# Patient Record
Sex: FEMALE | Race: WHITE | Employment: FULL TIME | URBAN - METROPOLITAN AREA
[De-identification: names, ages, dates, MRNs, and addresses within clinical notes are randomized per-mention and may not be internally consistent; named-entity substitution may affect disease eponyms.]

---

## 2019-03-06 ENCOUNTER — APPOINTMENT (OUTPATIENT)
Dept: NUCLEAR MEDICINE | Facility: CLINIC | Age: 64
End: 2019-03-06
Attending: EMERGENCY MEDICINE
Payer: COMMERCIAL

## 2019-03-06 ENCOUNTER — APPOINTMENT (OUTPATIENT)
Dept: GENERAL RADIOLOGY | Facility: CLINIC | Age: 64
End: 2019-03-06
Attending: EMERGENCY MEDICINE
Payer: COMMERCIAL

## 2019-03-06 ENCOUNTER — HOSPITAL ENCOUNTER (EMERGENCY)
Facility: CLINIC | Age: 64
Discharge: HOME | End: 2019-03-06
Attending: EMERGENCY MEDICINE | Admitting: EMERGENCY MEDICINE
Payer: COMMERCIAL

## 2019-03-06 VITALS
TEMPERATURE: 98.8 F | HEIGHT: 62 IN | SYSTOLIC BLOOD PRESSURE: 144 MMHG | DIASTOLIC BLOOD PRESSURE: 77 MMHG | HEART RATE: 71 BPM | RESPIRATION RATE: 16 BRPM | OXYGEN SATURATION: 98 % | BODY MASS INDEX: 32.62 KG/M2 | WEIGHT: 177.25 LBS

## 2019-03-06 DIAGNOSIS — R07.89 CHEST HEAVINESS: ICD-10-CM

## 2019-03-06 LAB
ANION GAP SERPL CALCULATED.3IONS-SCNC: 7 MMOL/L (ref 3–14)
BASOPHILS # BLD AUTO: 0.1 10E9/L (ref 0–0.2)
BASOPHILS NFR BLD AUTO: 1.3 %
BUN SERPL-MCNC: 14 MG/DL (ref 7–30)
CALCIUM SERPL-MCNC: 8.7 MG/DL (ref 8.5–10.1)
CHLORIDE SERPL-SCNC: 109 MMOL/L (ref 94–109)
CO2 SERPL-SCNC: 25 MMOL/L (ref 20–32)
CREAT SERPL-MCNC: 0.57 MG/DL (ref 0.52–1.04)
D DIMER PPP FEU-MCNC: 3.4 UG/ML FEU (ref 0–0.5)
DIFFERENTIAL METHOD BLD: ABNORMAL
EOSINOPHIL # BLD AUTO: 0.6 10E9/L (ref 0–0.7)
EOSINOPHIL NFR BLD AUTO: 11.1 %
ERYTHROCYTE [DISTWIDTH] IN BLOOD BY AUTOMATED COUNT: 13.7 % (ref 10–15)
GFR SERPL CREATININE-BSD FRML MDRD: >90 ML/MIN/{1.73_M2}
GLUCOSE SERPL-MCNC: 95 MG/DL (ref 70–99)
HCT VFR BLD AUTO: 36.3 % (ref 35–47)
HGB BLD-MCNC: 11.3 G/DL (ref 11.7–15.7)
IMM GRANULOCYTES # BLD: 0 10E9/L (ref 0–0.4)
IMM GRANULOCYTES NFR BLD: 0.4 %
INTERPRETATION ECG - MUSE: NORMAL
LYMPHOCYTES # BLD AUTO: 1.1 10E9/L (ref 0.8–5.3)
LYMPHOCYTES NFR BLD AUTO: 20.5 %
MCH RBC QN AUTO: 28.7 PG (ref 26.5–33)
MCHC RBC AUTO-ENTMCNC: 31.1 G/DL (ref 31.5–36.5)
MCV RBC AUTO: 92 FL (ref 78–100)
MONOCYTES # BLD AUTO: 0.4 10E9/L (ref 0–1.3)
MONOCYTES NFR BLD AUTO: 7.5 %
NEUTROPHILS # BLD AUTO: 3.2 10E9/L (ref 1.6–8.3)
NEUTROPHILS NFR BLD AUTO: 59.2 %
NRBC # BLD AUTO: 0 10*3/UL
NRBC BLD AUTO-RTO: 0 /100
PLATELET # BLD AUTO: 374 10E9/L (ref 150–450)
POTASSIUM SERPL-SCNC: 4 MMOL/L (ref 3.4–5.3)
RBC # BLD AUTO: 3.94 10E12/L (ref 3.8–5.2)
SODIUM SERPL-SCNC: 141 MMOL/L (ref 133–144)
TROPONIN I SERPL-MCNC: <0.015 UG/L (ref 0–0.04)
TROPONIN I SERPL-MCNC: <0.015 UG/L (ref 0–0.04)
TSH SERPL DL<=0.005 MIU/L-ACNC: 0.66 MU/L (ref 0.4–4)
WBC # BLD AUTO: 5.3 10E9/L (ref 4–11)

## 2019-03-06 PROCEDURE — 34300033 ZZH RX 343: Performed by: EMERGENCY MEDICINE

## 2019-03-06 PROCEDURE — 99285 EMERGENCY DEPT VISIT HI MDM: CPT | Mod: 25

## 2019-03-06 PROCEDURE — 78580 LUNG PERFUSION IMAGING: CPT

## 2019-03-06 PROCEDURE — 93005 ELECTROCARDIOGRAM TRACING: CPT

## 2019-03-06 PROCEDURE — 85379 FIBRIN DEGRADATION QUANT: CPT | Performed by: EMERGENCY MEDICINE

## 2019-03-06 PROCEDURE — 25000132 ZZH RX MED GY IP 250 OP 250 PS 637: Performed by: EMERGENCY MEDICINE

## 2019-03-06 PROCEDURE — 85025 COMPLETE CBC W/AUTO DIFF WBC: CPT | Performed by: EMERGENCY MEDICINE

## 2019-03-06 PROCEDURE — 84484 ASSAY OF TROPONIN QUANT: CPT | Performed by: EMERGENCY MEDICINE

## 2019-03-06 PROCEDURE — 71046 X-RAY EXAM CHEST 2 VIEWS: CPT

## 2019-03-06 PROCEDURE — 84443 ASSAY THYROID STIM HORMONE: CPT | Performed by: EMERGENCY MEDICINE

## 2019-03-06 PROCEDURE — A9540 TC99M MAA: HCPCS | Performed by: EMERGENCY MEDICINE

## 2019-03-06 PROCEDURE — 80048 BASIC METABOLIC PNL TOTAL CA: CPT | Performed by: EMERGENCY MEDICINE

## 2019-03-06 RX ORDER — OXYCODONE HYDROCHLORIDE 5 MG/1
5-10 TABLET ORAL
COMMUNITY
Start: 2019-03-04

## 2019-03-06 RX ORDER — ALBUTEROL SULFATE 90 UG/1
2 AEROSOL, METERED RESPIRATORY (INHALATION)
COMMUNITY
Start: 2016-02-17

## 2019-03-06 RX ORDER — VALACYCLOVIR HYDROCHLORIDE 1 G/1
1 TABLET, FILM COATED ORAL
COMMUNITY
Start: 2016-02-16

## 2019-03-06 RX ORDER — MONTELUKAST SODIUM 10 MG/1
10 TABLET ORAL
COMMUNITY
Start: 2016-08-01

## 2019-03-06 RX ORDER — ESCITALOPRAM OXALATE 20 MG/1
20 TABLET ORAL
COMMUNITY
Start: 2016-08-08

## 2019-03-06 RX ORDER — MULTIVIT-MIN/IRON/FOLIC ACID/K 18-600-40
1 CAPSULE ORAL
COMMUNITY
Start: 2009-12-01

## 2019-03-06 RX ORDER — ALBUTEROL SULFATE 0.83 MG/ML
2.5 SOLUTION RESPIRATORY (INHALATION)
COMMUNITY
Start: 2009-12-09

## 2019-03-06 RX ORDER — ASPIRIN 81 MG/1
162 TABLET ORAL
COMMUNITY
Start: 2019-02-06 | End: 2019-03-20

## 2019-03-06 RX ORDER — IBUPROFEN 200 MG
400 TABLET ORAL
COMMUNITY

## 2019-03-06 RX ORDER — GABAPENTIN 300 MG/1
300 CAPSULE ORAL
COMMUNITY
Start: 2019-02-06 | End: 2019-03-08

## 2019-03-06 RX ORDER — FLUTICASONE PROPIONATE 50 MCG
2 SPRAY, SUSPENSION (ML) NASAL
COMMUNITY
Start: 2016-08-01

## 2019-03-06 RX ORDER — ACETAMINOPHEN 325 MG/1
325-650 TABLET ORAL
COMMUNITY
Start: 2014-07-01

## 2019-03-06 RX ORDER — PRAMIPEXOLE DIHYDROCHLORIDE 0.12 MG/1
TABLET ORAL
COMMUNITY
Start: 2016-08-05

## 2019-03-06 RX ORDER — LISINOPRIL 5 MG/1
5 TABLET ORAL
COMMUNITY
Start: 2016-08-01

## 2019-03-06 RX ORDER — ASPIRIN 325 MG
325 TABLET ORAL ONCE
Status: COMPLETED | OUTPATIENT
Start: 2019-03-06 | End: 2019-03-06

## 2019-03-06 RX ORDER — BACLOFEN 10 MG/1
5 TABLET ORAL
COMMUNITY
Start: 2016-08-05

## 2019-03-06 RX ADMIN — ASPIRIN 325 MG ORAL TABLET 325 MG: 325 PILL ORAL at 08:39

## 2019-03-06 RX ADMIN — Medication 3.3 MCI.: at 14:01

## 2019-03-06 ASSESSMENT — ENCOUNTER SYMPTOMS
FEVER: 0
SHORTNESS OF BREATH: 0
ABDOMINAL PAIN: 0
NAUSEA: 0
DIARRHEA: 0
VOMITING: 0
BACK PAIN: 0

## 2019-03-06 ASSESSMENT — MIFFLIN-ST. JEOR: SCORE: 1317.25

## 2019-03-06 NOTE — ED AVS SNAPSHOT
[facility] Emergency Department  [address]  [facility] [address]  Phone:  [phone]  Fax:  [phone]                                    Brandy Garcia   MRN: [id number]    Department:  [facility] Emergency Department   Date of Visit:  3/6/2019           After Visit Summary Signature Page    I have received my discharge instructions, and my questions have been answered. I have discussed any challenges I see with this plan with the nurse or doctor.    ..........................................................................................................................................  Patient/Patient Representative Signature      ..........................................................................................................................................  Patient Representative Print Name and Relationship to Patient    ..................................................               ................................................  Date                                   Time    ..........................................................................................................................................  Reviewed by Signature/Title    ...................................................              ..............................................  Date                                               Time          22EPIC Rev 08/18

## 2019-03-06 NOTE — ED PROVIDER NOTES
"  History     Chief Complaint:    Chest Pressure    HPI   Brandy Garcia is a 63 year old female with a history of hypertension who presents with chest pressure. The patient reports that for the past 2 days she has been experiencing chest pressure that she notes feels like there is a \"lump\" in her chest. She recalls that she was initially suspicious for indigestion or a blockage as she has a history of gastric bypass 10 years ago. The patient states that the sensation is not subsiding and it worsens when stands up. She denies shortness of breath, fever, nausea, vomiting, diarrhea, abdominal pain, chest pain, rash, back pain, or pain in her extremities. The patient has not taken anything for her discomfort. She also has not had any recent illness or a history of stress test. Lastly, the patient reports that she had a left knee replacement 4 weeks ago that is recovering as planned and she attended physical therapy yesterday without difficulty.    Cardiac/PE/DVT Risk Factors:  History of hypertension - Positive  History of hyperlipidemia - Negative  History of diabetes - Negative  History of smoking - Negative  Personal history of PE/DVT - Negative  Family history of heart complications - Family history unknown.   Recent travel - Negative  Recent surgery - Positive    Allergies:  Contrast day; anaphylaxis     Medications:    Albuterol  Aspirin  Lioresal  Lexapro  Flonase  Advair Diskus  Gabapentin  Lisinopril  Singulair  Mirapex  Valtrex    Past Medical History:    Hypertension     Past Surgical History:    Left knee replacement     Family History:    Family history reviewed. No pertinent family history.     Social History:  The patient was accompanied to the ED by .  Smoking Status: Never Smoker  Smokeless Tobacco: Never Used  Alcohol Use: Positive  Drug Use: Negative  PCP: Shanta Garnica   Marital Status:        Review of Systems   Constitutional: Negative for fever.   Respiratory: Negative for " "shortness of breath.    Cardiovascular: Negative for chest pain.        Chest pressure   Gastrointestinal: Negative for abdominal pain, diarrhea, nausea and vomiting.   Musculoskeletal: Negative for back pain.        No extremity pain   Skin: Negative for rash.   All other systems reviewed and are negative.    Physical Exam     Patient Vitals for the past 24 hrs:   BP Temp Temp src Pulse Heart Rate Resp SpO2 Height Weight   03/06/19 1453 144/77 -- -- 71 -- 16 98 % -- --   03/06/19 1445 144/77 -- -- -- -- -- -- -- --   03/06/19 1315 -- -- -- -- 94 -- -- -- --   03/06/19 1300 (!) 135/91 -- -- 84 84 -- -- -- --   03/06/19 1245 125/60 -- -- 87 89 -- -- -- --   03/06/19 1230 132/80 -- -- 87 86 -- -- -- --   03/06/19 1215 121/58 -- -- 87 89 -- -- -- --   03/06/19 1200 127/62 -- -- 86 84 -- -- -- --   03/06/19 1145 128/66 -- -- 82 84 -- -- -- --   03/06/19 1130 -- -- -- -- 84 -- -- -- --   03/06/19 1115 -- -- -- -- 79 -- -- -- --   03/06/19 1100 -- -- -- -- 77 -- 96 % -- --   03/06/19 1045 (!) 141/106 -- -- 80 73 -- 97 % -- --   03/06/19 1030 (!) 142/94 -- -- 75 71 -- 96 % -- --   03/06/19 1015 (!) 146/108 -- -- 73 71 -- 95 % -- --   03/06/19 1000 (!) 130/110 -- -- 73 69 -- 98 % -- --   03/06/19 0945 129/86 -- -- 73 73 -- 95 % -- --   03/06/19 0930 138/76 -- -- 72 68 -- 98 % -- --   03/06/19 0915 131/74 -- -- 68 70 -- 95 % -- --   03/06/19 0900 142/77 -- -- 71 70 -- 95 % -- --   03/06/19 0845 (!) 126/103 -- -- 77 74 -- 98 % -- --   03/06/19 0830 135/80 -- -- 67 63 -- 98 % -- --   03/06/19 0815 132/82 -- -- 70 67 -- -- -- --   03/06/19 0800 (!) 132/109 -- -- 70 68 -- 98 % -- --   03/06/19 0717 (!) 154/91 98.8  F (37.1  C) Oral 72 72 16 97 % 1.575 m (5' 2\") 80.4 kg (177 lb 4 oz)     Physical Exam     General: Alert, appears well-developed and well-nourished. Cooperative.     In no distress  HEENT:  Head:  Atraumatic  Ears:  External ears are normal  Mouth/Throat:  Oropharynx is without erythema or exudate and mucous " membranes are moist.   Eyes:   Conjunctivae normal and EOM are normal. No scleral icterus.  CV:  Normal rate, regular rhythm, normal heart sounds and radial pulses are 2+ and symmetric.  No murmur.  Resp:  Breath sounds are clear bilaterally    Non-labored, no retractions or accessory muscle use  GI:  Abdomen is soft, no distension, no tenderness. No rebound or guarding.  No CVA tenderness bilaterally  MS:  Normal range of motion. No edema.    Normal strength in all 4 extremities.     Back atraumatic.    No midline cervical, thoracic, or lumbar tenderness  Skin:  Warm and dry.  Well healing scar to left knee.   Neuro: Alert. Normal strength.  GCS: 15  Psych:  Normal mood and affect.    Emergency Department Course     ECG:  ECG taken at 0715, ECG read at 0729  Normal sinus rhythm  Normal ECG  Rate 69 bpm. IA interval 164 ms. QRS duration 86 ms. QT/QTc 394/422 ms. P-R-T axes 71 39 37.    Imaging:  Radiology findings were communicated with the patient who voiced understanding of the findings.    NM Lung Scan Perfusion Particulate   Final Result   IMPRESSION: Unremarkable lung perfusion scan.       PEG JOSHI MD      Chest XR,  PA & LAT   Final Result   IMPRESSION: Negative chest. Lungs clear.      PEG JOSHI MD      NM Lexiscan stress test (nuc card)    (Results Pending)     Laboratory:  Laboratory findings were communicated with the patient who voiced understanding of the findings.    CBC: WBC 5.3, HGB 11.3 (L),   BMP: WNL (Creatinine 0.57)  Troponin (Collected 0815): <00.15  Troponin (Collected 1107): <0.015   TSH with free T4 Reflex: 0.66  D Dimer: 3.4 (H)    Interventions:  0839 Aspirin 325 mg PO  1401 Tc99m MAA 3.3 mCi IV    Emergency Department Course:     Nursing notes and vitals reviewed.    0715 EKG obtained as noted above.    0737 I performed an exam of the patient as documented above.     0815 IV was inserted and blood was drawn for laboratory testing, results above.     1107 Patient  rechecked and updated.      1311 The patient was sent for a Lung Vent and Perf while in the emergency department, results above.      1355 The patient was sent for a XR while in the emergency department, results above.      1432 Patient rechecked and updated.       I personally reviewed the imaging, lab, and EKG results with the patient and answered all related questions prior to discharge.    HEART Score  Background  Calculates the overall risk of adverse event in patient's presenting with chest pain.  Based on 5 criteria (each assigned 0-2 points) including suspiciousness of history, EKG, age, risk factors and troponin.    Data  63 year old female  does not have a problem list on file.   reports that  has never smoked. She does not have any smokeless tobacco history on file.  family history is not on file.  No results found for: TROPI  Criteria   0-2 points for each of 5 items (maximum of 10 points):  Score 0- History slightly suspicious for coronary syndrome  Score 0- EKG Normal  Score 1- Age 45 to 65 years old  Score 1- One to 2 risk factors for atherosclerotic disease  Score 0- Within normal limits for troponin levels  Interpretation  Risk of adverse outcome  Heart Score: 2  Total Score 0-3- Adverse Outcome Risk 2.5% - Supports early discharge with appropriate follow-up    Impression & Plan      Medical Decision Making:  Brandy Garcia is a 63 year old female with a history of hypertension who presents with chest pressure ongoing for the last 2 days.  Patient recently had a total left knee replacement within the last 2 months and ultimately is concerned for potential cardiac or blood clot pathology.  Patient has an elevated d-dimer here, but does have a significant anaphylaxis to contrast dye.  A V/Q scan was obtained showing low concern for pulmonary embolism.  Patient's initial EKG shows no concerning ischemic changes.  There is no prior EKG to compare with.  Initial troponin is undetectable, lower concern  for ACS.  A repeat 3-hour troponin was obtained and remains undetectable.  In the setting of 2-3 days of ongoing chest heaviness symptoms, I suspect an atypical etiology of the patient's chest discomfort.  I do feel the patient would benefit from a stress test and ultimately due to her inability to fully range her left lower extremity she would require a medical stress test.  A nuclear stress test was ordered for the patient although due to the V/Q scan today, will be unable to obtain this until earliest Friday.  Her heart score is 2.  Ultimately she feels comfortable with close outpatient follow-up and stress testing in the next 72 hours.  Stress testing ordered prior to discharge.  She will return to the emergency department for any worsening chest pain symptoms or any new symptom onset.  All questions answered for discharge and return precautions understood.  Discharged home.    Diagnosis:    ICD-10-CM    1. Chest heaviness R07.89 NM Lexiscan stress test (nuc card)     Disposition:   The patient is discharged to home.     Discharge Medications:    No discharge medications.     Scribe Disclosure:  I, Orla Severson, am serving as a scribe at 7:32 AM on 3/6/2019 to document services personally performed by Ravin Perez MD based on my observations and the provider's statements to me.     [facility] EMERGENCY DEPARTMENT       Ravin Perez MD  03/06/19 1144

## 2019-03-08 ENCOUNTER — HOSPITAL ENCOUNTER (OUTPATIENT)
Dept: NUCLEAR MEDICINE | Facility: CLINIC | Age: 64
Setting detail: NUCLEAR MEDICINE
Discharge: HOME | End: 2019-03-08
Attending: EMERGENCY MEDICINE | Admitting: EMERGENCY MEDICINE
Payer: COMMERCIAL

## 2019-03-08 ENCOUNTER — HOSPITAL ENCOUNTER (OUTPATIENT)
Dept: CARDIOLOGY | Facility: CLINIC | Age: 64
Discharge: HOME | End: 2019-03-08
Attending: EMERGENCY MEDICINE | Admitting: EMERGENCY MEDICINE
Payer: COMMERCIAL

## 2019-03-08 DIAGNOSIS — R07.89 CHEST HEAVINESS: ICD-10-CM

## 2019-03-08 PROCEDURE — 93016 CV STRESS TEST SUPVJ ONLY: CPT | Performed by: INTERNAL MEDICINE

## 2019-03-08 PROCEDURE — 34300033 ZZH RX 343: Performed by: EMERGENCY MEDICINE

## 2019-03-08 PROCEDURE — 78451 HT MUSCLE IMAGE SPECT SING: CPT

## 2019-03-08 PROCEDURE — 93018 CV STRESS TEST I&R ONLY: CPT | Performed by: INTERNAL MEDICINE

## 2019-03-08 PROCEDURE — 78451 HT MUSCLE IMAGE SPECT SING: CPT | Mod: 26 | Performed by: INTERNAL MEDICINE

## 2019-03-08 PROCEDURE — A9502 TC99M TETROFOSMIN: HCPCS | Performed by: EMERGENCY MEDICINE

## 2019-03-08 RX ORDER — REGADENOSON 0.08 MG/ML
INJECTION, SOLUTION INTRAVENOUS
Status: DISCONTINUED
Start: 2019-03-08 | End: 2019-03-08 | Stop reason: WASHOUT

## 2019-03-08 RX ADMIN — TETROFOSMIN 10 MCI.: 1.38 INJECTION, POWDER, LYOPHILIZED, FOR SOLUTION INTRAVENOUS at 08:01
